# Patient Record
Sex: FEMALE | URBAN - METROPOLITAN AREA
[De-identification: names, ages, dates, MRNs, and addresses within clinical notes are randomized per-mention and may not be internally consistent; named-entity substitution may affect disease eponyms.]

---

## 2019-12-30 PROBLEM — Z00.00 ENCOUNTER FOR PREVENTIVE HEALTH EXAMINATION: Status: ACTIVE | Noted: 2019-12-30

## 2023-11-20 ENCOUNTER — LAB OUTSIDE AN ENCOUNTER (OUTPATIENT)
Dept: URBAN - METROPOLITAN AREA CLINIC 109 | Facility: CLINIC | Age: 63
End: 2023-11-20

## 2023-11-20 ENCOUNTER — DASHBOARD ENCOUNTERS (OUTPATIENT)
Age: 63
End: 2023-11-20

## 2023-11-20 ENCOUNTER — TELEPHONE ENCOUNTER (OUTPATIENT)
Dept: URBAN - METROPOLITAN AREA CLINIC 109 | Facility: CLINIC | Age: 63
End: 2023-11-20

## 2023-11-20 ENCOUNTER — OFFICE VISIT (OUTPATIENT)
Dept: URBAN - METROPOLITAN AREA CLINIC 109 | Facility: CLINIC | Age: 63
End: 2023-11-20
Payer: COMMERCIAL

## 2023-11-20 VITALS
HEIGHT: 64 IN | WEIGHT: 122 LBS | DIASTOLIC BLOOD PRESSURE: 88 MMHG | HEART RATE: 82 BPM | BODY MASS INDEX: 20.83 KG/M2 | SYSTOLIC BLOOD PRESSURE: 139 MMHG | TEMPERATURE: 97.7 F

## 2023-11-20 DIAGNOSIS — A04.8 BACTERIAL INFECTION DUE TO H. PYLORI: ICD-10-CM

## 2023-11-20 DIAGNOSIS — Z86.010 HISTORY OF COLON POLYPS: ICD-10-CM

## 2023-11-20 PROBLEM — 428283002: Status: ACTIVE | Noted: 2023-11-20

## 2023-11-20 PROCEDURE — 99202 OFFICE O/P NEW SF 15 MIN: CPT | Performed by: INTERNAL MEDICINE

## 2023-11-20 RX ORDER — TIZANIDINE 4 MG/1
1 TABLET TABLET ORAL AT BEDTIME
Status: ACTIVE | COMMUNITY

## 2023-11-20 NOTE — PHYSICAL EXAM CHEST:
chest wall non-tender, breathing is unlabored without accessory muscle use, normal breath sounds detailed exam

## 2023-11-20 NOTE — HPI-TODAY'S VISIT:
The patient has been referred for colon screening. Previous colonoscopy over 10 years.  She had polyps. No FH of CRC. Denies change in bowel habits, blood in the stool, abdominal pain or weight loss. Bowel habist are irregular- loose alternating with constipation. Also told of a positive h pylori blood test in May.  No treatment in the past.  Prior hx of PUD.

## 2023-11-27 LAB
A/G RATIO: 1.5
ALBUMIN: 4.3
ALKALINE PHOSPHATASE: 81
ALT (SGPT): 17
AST (SGOT): 14
BILIRUBIN, TOTAL: 0.4
BUN/CREATININE RATIO: (no result)
BUN: 13
CALCIUM: 9.8
CARBON DIOXIDE, TOTAL: 26
CHLORIDE: 105
CREATININE: 0.85
EGFR: 77
GLOBULIN, TOTAL: 2.9
GLUCOSE: 79
HEPATITIS B CORE AB TOTAL: (no result)
HEPATITIS B SURFACE ANTIGEN: (no result)
HEPATITIS C ANTIBODY: (no result)
POTASSIUM: 4.1
PROTEIN, TOTAL: 7.2
SODIUM: 144

## 2023-11-28 ENCOUNTER — TELEPHONE ENCOUNTER (OUTPATIENT)
Dept: URBAN - METROPOLITAN AREA CLINIC 92 | Facility: CLINIC | Age: 63
End: 2023-11-28

## 2023-12-04 LAB — H PYLORI BREATH TEST: NOT DETECTED

## 2023-12-06 ENCOUNTER — OUT OF OFFICE VISIT (OUTPATIENT)
Dept: URBAN - METROPOLITAN AREA SURGERY CENTER 23 | Facility: SURGERY CENTER | Age: 63
End: 2023-12-06
Payer: COMMERCIAL

## 2023-12-06 DIAGNOSIS — Z86.010 ADENOMAS PERSONAL HISTORY OF COLONIC POLYPS: ICD-10-CM

## 2023-12-06 DIAGNOSIS — Z86.010: ICD-10-CM

## 2023-12-06 PROCEDURE — 45378 DIAGNOSTIC COLONOSCOPY: CPT | Performed by: INTERNAL MEDICINE

## 2023-12-06 PROCEDURE — G8907 PT DOC NO EVENTS ON DISCHARG: HCPCS | Performed by: INTERNAL MEDICINE

## 2023-12-06 PROCEDURE — 00811 ANES LWR INTST NDSC NOS: CPT | Performed by: NURSE ANESTHETIST, CERTIFIED REGISTERED

## 2023-12-06 RX ORDER — TIZANIDINE 4 MG/1
1 TABLET TABLET ORAL AT BEDTIME
Status: ACTIVE | COMMUNITY

## 2025-01-28 ENCOUNTER — LAB OUTSIDE AN ENCOUNTER (OUTPATIENT)
Dept: URBAN - METROPOLITAN AREA CLINIC 109 | Facility: CLINIC | Age: 65
End: 2025-01-28

## 2025-01-28 ENCOUNTER — OFFICE VISIT (OUTPATIENT)
Dept: URBAN - METROPOLITAN AREA CLINIC 109 | Facility: CLINIC | Age: 65
End: 2025-01-28
Payer: MEDICARE

## 2025-01-28 VITALS
SYSTOLIC BLOOD PRESSURE: 132 MMHG | DIASTOLIC BLOOD PRESSURE: 88 MMHG | WEIGHT: 122 LBS | HEART RATE: 90 BPM | BODY MASS INDEX: 20.83 KG/M2 | HEIGHT: 64 IN

## 2025-01-28 DIAGNOSIS — R10.13 DYSPEPSIA: ICD-10-CM

## 2025-01-28 PROCEDURE — 99204 OFFICE O/P NEW MOD 45 MIN: CPT

## 2025-01-28 RX ORDER — PANTOPRAZOLE SODIUM 40 MG/1
1 TABLET TABLET, DELAYED RELEASE ORAL ONCE A DAY
Qty: 90 TABLET | Refills: 3 | OUTPATIENT
Start: 2025-01-28

## 2025-01-28 RX ORDER — GABAPENTIN 300 MG/1
1 CAPSULE CAPSULE ORAL ONCE A DAY
Status: ACTIVE | COMMUNITY

## 2025-01-28 RX ORDER — TIZANIDINE 4 MG/1
1 TABLET TABLET ORAL AT BEDTIME
Status: ACTIVE | COMMUNITY

## 2025-01-28 NOTE — HPI-TODAY'S VISIT:
Patient is a 66 y/o F who presents with new onset dyspepsia x 3 weeks. Her sister in the room w/ her today. Patient reports symptoms started w/ sore throat, N/V/D, weakness, and epigastric abd pain ~3 weeks ago. Most symptoms resolved after 24 hours, but epigastric abd pain still ongoing intermittently. She is currently having some pain described as burning. Other times pain feels dull. Lasts anywhere from 20 min to 1 hour. Pain does not radiate. Pain comes on randomly. She has not noticed specific triggers like eating. She has only been eating oatmal and soup b/c she is scared of symptoms. Denies hematemesis, early satiety, fevers, chills, unintentional weight loss. No rectal bleeding or change in bowel habits. Denies NSAID use. She notes a history of PUD and h pylori (11/2023).  Denies cardiac, lung, or kidney problems. Not on blood thinners. Prior EGD > 20 years ago in [place]. No records available. Up to date w/ colonoscopy screening.

## 2025-02-12 ENCOUNTER — CLAIMS CREATED FROM THE CLAIM WINDOW (OUTPATIENT)
Dept: URBAN - METROPOLITAN AREA SURGERY CENTER 23 | Facility: SURGERY CENTER | Age: 65
End: 2025-02-12
Payer: MEDICARE

## 2025-02-12 ENCOUNTER — CLAIMS CREATED FROM THE CLAIM WINDOW (OUTPATIENT)
Dept: URBAN - METROPOLITAN AREA CLINIC 4 | Facility: CLINIC | Age: 65
End: 2025-02-12
Payer: MEDICARE

## 2025-02-12 DIAGNOSIS — K31.89 OTHER DISEASES OF STOMACH AND DUODENUM: ICD-10-CM

## 2025-02-12 DIAGNOSIS — K31.7 BENIGN GASTRIC POLYP: ICD-10-CM

## 2025-02-12 DIAGNOSIS — K22.89 OTHER SPECIFIED DISEASE OF ESOPHAGUS: ICD-10-CM

## 2025-02-12 DIAGNOSIS — R10.13 ABDOMINAL DISCOMFORT, EPIGASTRIC: ICD-10-CM

## 2025-02-12 DIAGNOSIS — K29.70 GASTRITIS, UNSPECIFIED, WITHOUT BLEEDING: ICD-10-CM

## 2025-02-12 DIAGNOSIS — K31.7 GASTRIC POLYPS: ICD-10-CM

## 2025-02-12 DIAGNOSIS — K31.7 POLYP OF STOMACH AND DUODENUM: ICD-10-CM

## 2025-02-12 PROCEDURE — 43239 EGD BIOPSY SINGLE/MULTIPLE: CPT | Performed by: INTERNAL MEDICINE

## 2025-02-12 PROCEDURE — 43239 EGD BIOPSY SINGLE/MULTIPLE: CPT | Performed by: CLINIC/CENTER

## 2025-02-12 PROCEDURE — 88305 TISSUE EXAM BY PATHOLOGIST: CPT | Performed by: PATHOLOGY

## 2025-02-12 PROCEDURE — 00731 ANES UPR GI NDSC PX NOS: CPT | Performed by: NURSE ANESTHETIST, CERTIFIED REGISTERED

## 2025-02-12 PROCEDURE — 88312 SPECIAL STAINS GROUP 1: CPT | Performed by: PATHOLOGY

## 2025-02-12 RX ORDER — TIZANIDINE 4 MG/1
1 TABLET TABLET ORAL AT BEDTIME
Status: ACTIVE | COMMUNITY

## 2025-02-12 RX ORDER — GABAPENTIN 300 MG/1
1 CAPSULE CAPSULE ORAL ONCE A DAY
Status: ACTIVE | COMMUNITY

## 2025-02-12 RX ORDER — PANTOPRAZOLE SODIUM 40 MG/1
1 TABLET TABLET, DELAYED RELEASE ORAL ONCE A DAY
Qty: 90 TABLET | Refills: 3 | Status: ACTIVE | COMMUNITY
Start: 2025-01-28

## 2025-03-11 ENCOUNTER — TELEPHONE ENCOUNTER (OUTPATIENT)
Dept: URBAN - METROPOLITAN AREA CLINIC 109 | Facility: CLINIC | Age: 65
End: 2025-03-11

## 2025-05-15 ENCOUNTER — OFFICE VISIT (OUTPATIENT)
Dept: URBAN - METROPOLITAN AREA CLINIC 109 | Facility: CLINIC | Age: 65
End: 2025-05-15
Payer: MEDICARE

## 2025-05-15 DIAGNOSIS — R10.13 DYSPEPSIA: ICD-10-CM

## 2025-05-15 DIAGNOSIS — R19.7 INTERMITTENT DIARRHEA: ICD-10-CM

## 2025-05-15 PROCEDURE — 99214 OFFICE O/P EST MOD 30 MIN: CPT

## 2025-05-15 RX ORDER — TIZANIDINE 4 MG/1
1 TABLET TABLET ORAL AT BEDTIME
Status: ACTIVE | COMMUNITY

## 2025-05-15 RX ORDER — GABAPENTIN 300 MG/1
1 CAPSULE CAPSULE ORAL ONCE A DAY
Status: ACTIVE | COMMUNITY

## 2025-05-15 RX ORDER — PANTOPRAZOLE SODIUM 40 MG/1
1 TABLET TABLET, DELAYED RELEASE ORAL ONCE A DAY
Qty: 90 TABLET | Refills: 3 | Status: ACTIVE | COMMUNITY
Start: 2025-01-28

## 2025-05-15 NOTE — HPI-TODAY'S VISIT:
Patient presents for follow up of dyspepsia. Since last OV, patient had EGD that revealed irregular Z-line, 38 cm from the incisors (Bx- GASTRIC-TYPE MUCOSA WITHOUT SIGNIFICANT ABNORMALITY; NO SQUAMOUS MUCOSA IDENTIFIED), small hiatal hernia, non-erosive gastritis (bx- foveolar hyperplasia), normal examined duodenum, a few fundic gland polyps (bx- fundic gland polyp).  Patient reports symptoms have significantly improved on pantoprazole 40mg daily. She is eating normal foods again.  N/V/D and weakness have resolved. Still has mild, intermittent epigastric discomfort. Patient unable to describe pain, but no longer burning. Discomfort is not associated with eating. Bowel habits are back to normal. Rarely will have one day of 5-6 loose BMs. Thinks symptoms may be related to diet. No new meds, stress, or changes in diet. ----------- 1/28/2025 Patient is a 66 y/o F who presents with new onset dyspepsia x 3 weeks. Her sister in the room w/ her today. Patient reports symptoms started w/ sore throat, N/V/D, weakness, and epigastric abd pain ~3 weeks ago. Most symptoms resolved after 24 hours, but epigastric abd pain still ongoing intermittently. She is currently having some pain described as burning. Other times pain feels dull. Lasts anywhere from 20 min to 1 hour. Pain does not radiate. Pain comes on randomly. She has not noticed specific triggers like eating. She has only been eating oatmeal and soup b/c she is scared of symptoms. Denies hematemesis, early satiety, fevers, chills, unintentional weight loss. No rectal bleeding or change in bowel habits. Denies NSAID use. She notes a history of PUD and h pylori (11/2023).  Denies cardiac, lung, or kidney problems. Not on blood thinners. Prior EGD > 20 years ago in [place]. No records available. Up to date w/ colonoscopy screening.